# Patient Record
Sex: FEMALE | URBAN - METROPOLITAN AREA
[De-identification: names, ages, dates, MRNs, and addresses within clinical notes are randomized per-mention and may not be internally consistent; named-entity substitution may affect disease eponyms.]

---

## 2021-09-29 ENCOUNTER — APPOINTMENT (RX ONLY)
Dept: URBAN - METROPOLITAN AREA CLINIC 121 | Facility: CLINIC | Age: 61
Setting detail: DERMATOLOGY
End: 2021-09-29

## 2021-09-29 DIAGNOSIS — L68.0 HIRSUTISM: ICD-10-CM

## 2021-09-29 DIAGNOSIS — L82.1 OTHER SEBORRHEIC KERATOSIS: ICD-10-CM

## 2021-09-29 PROCEDURE — ? PRESCRIPTION

## 2021-09-29 PROCEDURE — 99203 OFFICE O/P NEW LOW 30 MIN: CPT

## 2021-09-29 PROCEDURE — ? TREATMENT REGIMEN

## 2021-09-29 RX ORDER — LIDOCAINE 4 G/100G
CREAM TOPICAL
Qty: 30 | Refills: 0 | Status: ERX | COMMUNITY
Start: 2021-09-29

## 2021-09-29 RX ADMIN — LIDOCAINE: 4 CREAM TOPICAL at 00:00

## 2021-09-29 ASSESSMENT — LOCATION SIMPLE DESCRIPTION DERM
LOCATION SIMPLE: RIGHT LOWER BACK
LOCATION SIMPLE: LEFT TEMPLE
LOCATION SIMPLE: RIGHT CHEEK
LOCATION SIMPLE: NECK
LOCATION SIMPLE: LEFT THIGH
LOCATION SIMPLE: SUBMENTAL CHIN
LOCATION SIMPLE: LEFT CHEEK

## 2021-09-29 ASSESSMENT — LOCATION DETAILED DESCRIPTION DERM
LOCATION DETAILED: RIGHT SUPERIOR LATERAL MIDBACK
LOCATION DETAILED: LEFT INFERIOR CENTRAL MALAR CHEEK
LOCATION DETAILED: LEFT ANTERIOR PROXIMAL THIGH
LOCATION DETAILED: RIGHT INFERIOR CENTRAL MALAR CHEEK
LOCATION DETAILED: LEFT CENTRAL POSTERIOR NECK
LOCATION DETAILED: SUBMENTAL CHIN
LOCATION DETAILED: LEFT CENTRAL TEMPLE

## 2021-09-29 ASSESSMENT — LOCATION ZONE DERM
LOCATION ZONE: FACE
LOCATION ZONE: LEG
LOCATION ZONE: TRUNK
LOCATION ZONE: NECK

## 2021-09-29 NOTE — PROCEDURE: TREATMENT REGIMEN
Plan: Recommended against LN2\\nIf had good response to cautery could consider more lesions with prior topical lidocaine
Detail Level: Zone
Initiate Treatment: Two test spots done with Cautery.
Otc Regimen: Recommended izquierdo
Plan: Discussed electrolysis vs laser (mostly gray hair) vs vaniqa (not covered) vs.  spironolactone\\nPatient has not tried OTC product like Viramontes since she was not aware they have a facial product too. Recommended to try that for the most cost effective treatment with minimal SEs